# Patient Record
Sex: FEMALE | Race: WHITE | URBAN - METROPOLITAN AREA
[De-identification: names, ages, dates, MRNs, and addresses within clinical notes are randomized per-mention and may not be internally consistent; named-entity substitution may affect disease eponyms.]

---

## 2022-03-29 ENCOUNTER — OFFICE VISIT (OUTPATIENT)
Dept: FAMILY MEDICINE CLINIC | Age: 37
End: 2022-03-29
Payer: COMMERCIAL

## 2022-03-29 VITALS
BODY MASS INDEX: 23.93 KG/M2 | SYSTOLIC BLOOD PRESSURE: 101 MMHG | TEMPERATURE: 97.5 F | WEIGHT: 152.5 LBS | OXYGEN SATURATION: 99 % | HEART RATE: 85 BPM | HEIGHT: 67 IN | DIASTOLIC BLOOD PRESSURE: 58 MMHG

## 2022-03-29 DIAGNOSIS — Z72.0 TOBACCO ABUSE: ICD-10-CM

## 2022-03-29 DIAGNOSIS — Z13.29 SCREENING FOR THYROID DISORDER: ICD-10-CM

## 2022-03-29 DIAGNOSIS — Z00.00 PREVENTATIVE HEALTH CARE: Primary | ICD-10-CM

## 2022-03-29 PROCEDURE — 99385 PREV VISIT NEW AGE 18-39: CPT | Performed by: NURSE PRACTITIONER

## 2022-03-29 RX ORDER — BUPROPION HYDROCHLORIDE 150 MG/1
150 TABLET, EXTENDED RELEASE ORAL 2 TIMES DAILY
Qty: 60 TABLET | Refills: 3 | Status: SHIPPED | OUTPATIENT
Start: 2022-03-29 | End: 2022-04-21

## 2022-03-29 SDOH — HEALTH STABILITY: PHYSICAL HEALTH: ON AVERAGE, HOW MANY MINUTES DO YOU ENGAGE IN EXERCISE AT THIS LEVEL?: 60 MIN

## 2022-03-29 SDOH — HEALTH STABILITY: PHYSICAL HEALTH: ON AVERAGE, HOW MANY DAYS PER WEEK DO YOU ENGAGE IN MODERATE TO STRENUOUS EXERCISE (LIKE A BRISK WALK)?: 5 DAYS

## 2022-03-29 ASSESSMENT — PATIENT HEALTH QUESTIONNAIRE - PHQ9
SUM OF ALL RESPONSES TO PHQ QUESTIONS 1-9: 1
2. FEELING DOWN, DEPRESSED OR HOPELESS: 0
1. LITTLE INTEREST OR PLEASURE IN DOING THINGS: 1
SUM OF ALL RESPONSES TO PHQ9 QUESTIONS 1 & 2: 1
SUM OF ALL RESPONSES TO PHQ QUESTIONS 1-9: 1

## 2022-03-29 ASSESSMENT — SOCIAL DETERMINANTS OF HEALTH (SDOH)

## 2022-03-29 NOTE — PROGRESS NOTES
Forrest Samson  : 1985  Encounter date: 3/29/2022    This keenan 39 y.o. female who presents with  Chief Complaint   Patient presents with    Annual Exam    Other     wants to quit vaping, discuss ADHD dx       History of present illness:    HPI   History and Physical      Forrest Samson  YOB: 1985    Date of Service:  3/29/2022    Chief Complaint:   Forrest Samson is a 39 y.o. female who  presents for physical examination. HPI: Pt is 39year old female, new to practice, due for labs. Requested PMH to be received. Concerns regarding ADHD. Pt vapes and would like to quit. Reports using nicotine supplements with little relief. Pt reports smoking a pod (equal to a pack) every 2 days.     Wt Readings from Last 3 Encounters:   22 152 lb 8 oz (69.2 kg)   13 164 lb (74.4 kg)   13 152 lb (68.9 kg)     BP Readings from Last 3 Encounters:   22 (!) 101/58   13 104/68   13 110/70       Patient Active Problem List   Diagnosis    Active labor    Tobacco abuse       Preventive Care:  Health Maintenance   Topic Date Due    Varicella vaccine (1 of 2 - 2-dose childhood series) Never done    COVID-19 Vaccine (1) Never done    Depression Screen  Never done    Cervical cancer screen  09/10/2017    Flu vaccine (1) Never done    DTaP/Tdap/Td vaccine (2 - Td or Tdap) 2023    Hepatitis C screen  Completed    HIV screen  Completed    Hepatitis A vaccine  Aged Out    Hepatitis B vaccine  Aged Out    Hib vaccine  Aged Out    Meningococcal (ACWY) vaccine  Aged Out    Pneumococcal 0-64 years Vaccine  Aged Out      Hx abnormal PAP: no  Sexual activity: single partner, contraception - none   Self-breast exams: yes  Previous DEXA scan: no  Last eye exam: lasik   Exercise: daily cardio and weight lifting  Lipid panel: No results found for: CHOL, TRIG, HDL, LDLCALC, LDLDIRECT     Living will:No    Immunization History   Administered Date(s) Administered    Tdap (Boostrix, Adacel) 2013       Allergies   Allergen Reactions    Bee Venom      Outpatient Medications Marked as Taking for the 3/29/22 encounter (Office Visit) with ROMERO Bourgeois NP   Medication Sig Dispense Refill    buPROPion (WELLBUTRIN SR) 150 MG extended release tablet Take 1 tablet by mouth 2 times daily First 3 days take 1 tablet once daily 60 tablet 3       Past Medical History:   Diagnosis Date    ADHD (attention deficit hyperactivity disorder)      Past Surgical History:   Procedure Laterality Date    BREAST ENHANCEMENT SURGERY      THERAPEUTIC       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     Family History   Problem Relation Age of Onset    Hypertension Maternal Grandmother     Diabetes Paternal Grandmother     Diabetes Father     Diabetes Paternal Uncle      Social History     Socioeconomic History    Marital status:      Spouse name: Not on file    Number of children: Not on file    Years of education: Not on file    Highest education level: Not on file   Occupational History    Not on file   Tobacco Use    Smoking status: Former Smoker     Packs/day: 0.25     Years: 19.00     Pack years: 4.75     Types: Cigarettes     Start date: 2000     Quit date: 2019     Years since quitting: 3.2    Smokeless tobacco: Never Used   Vaping Use    Vaping Use: Every day    Start date: 2019    Substances: Nicotine    Devices: Pre-filled pod   Substance and Sexual Activity    Alcohol use: Not Currently     Comment: special occasions    Drug use: No    Sexual activity: Yes     Partners: Male   Other Topics Concern    Not on file   Social History Narrative    Not on file     Social Determinants of Health     Financial Resource Strain:     Difficulty of Paying Living Expenses: Not on file   Food Insecurity:     Worried About Running Out of Food in the Last Year: Not on file    Vivien of Food in the Last Year: Not on file Transportation Needs:     Lack of Transportation (Medical): Not on file    Lack of Transportation (Non-Medical): Not on file   Physical Activity: Sufficiently Active    Days of Exercise per Week: 5 days    Minutes of Exercise per Session: 60 min   Stress:     Feeling of Stress : Not on file   Social Connections:     Frequency of Communication with Friends and Family: Not on file    Frequency of Social Gatherings with Friends and Family: Not on file    Attends Muslim Services: Not on file    Active Member of Clubs or Organizations: Not on file    Attends Club or Organization Meetings: Not on file    Marital Status: Not on file   Intimate Partner Violence: Not At Risk    Fear of Current or Ex-Partner: No    Emotionally Abused: No    Physically Abused: No    Sexually Abused: No   Housing Stability:     Unable to Pay for Housing in the Last Year: Not on file    Number of [place] in the Last Year: Not on file    Unstable Housing in the Last Year: Not on file       Review of Systems:  A comprehensive review of systems was negative except for what was noted in the HPI. Physical Exam:   Vitals:    03/29/22 1317   BP: (!) 101/58   Site: Right Upper Arm   Position: Sitting   Cuff Size: Large Adult   Pulse: 85   Temp: 97.5 °F (36.4 °C)   TempSrc: Infrared   SpO2: 99%   Weight: 152 lb 8 oz (69.2 kg)   Height: 5' 7\" (1.702 m)     Body mass index is 23.88 kg/m². Constitutional: She is oriented to person, place, and time. She appears well-developed and well-nourished. No distress. HEENT:   Head: Normocephalic and atraumatic. Right Ear: Tympanic membrane, external ear and ear canal normal.   Left Ear: Tympanic membrane, external ear and ear canal normal.   Nose: Nose normal.   Mouth/Throat: Oropharynx is clear and moist, and mucous membranes are normal.  There is no cervical adenopathy. Eyes: Conjunctivae and extraocular motions are normal. Pupils are equal, round, and reactive to light. Neck: Neck supple. No JVD present. Carotid bruit is not present. No mass and no thyromegaly present. Cardiovascular: Normal rate, regular rhythm, normal heart sounds and intact distal pulses. Exam reveals no gallop and no friction rub. No murmur heard. Pulmonary/Chest: Effort normal and breath sounds normal. No respiratory distress. She has no wheezes, rhonchi or rales. Abdominal: Soft, non-tender. Bowel sounds and aorta are normal. She exhibits no organomegaly, mass or bruit. Breast exam:  not examined. Musculoskeletal: Normal range of motion, no synovitis. She exhibits no edema. Neurological: She is alert and oriented to person, place, and time. She has normal reflexes. No cranial nerve deficit. Coordination normal.   Skin: Skin is warm and dry. There is no rash or erythema. No suspicious lesions noted. Psychiatric: She has a normal mood and affect. Her speech is normal and behavior is normal. Judgment, cognition and memory are normal.     Assessment/Plan:    Jony Oates was seen today for annual exam and other. Diagnoses and all orders for this visit:    Preventative health care  -     CBC with Auto Differential; Future  -     Comprehensive Metabolic Panel, Fasting; Future  -     Lipid, Fasting; Future  -     Denny Feliciano MD, Gynecology, [facility]    Screening for thyroid disorder  -     TSH with Reflex; Future    Tobacco abuse  -     buPROPion (WELLBUTRIN SR) 150 MG extended release tablet; Take 1 tablet by mouth 2 times daily First 3 days take 1 tablet once daily          No current outpatient medications on file prior to visit. No current facility-administered medications on file prior to visit.       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      Family History   Problem Relation Age of Onset    Hypertension Maternal Grandmother     Diabetes Paternal Grandmother     Diabetes Father     Diabetes Paternal Uncle       Social History     Tobacco Use    Smoking status: Former Smoker     Packs/day: 0.25     Years: 19.00     Pack years: 4.75     Types: Cigarettes     Start date: 1/1/2000     Quit date: 1/1/2019     Years since quitting: 3.2    Smokeless tobacco: Never Used   Substance Use Topics    Alcohol use: Not Currently     Comment: special occasions        Review of Systems    Objective:    BP (!) 101/58 (Site: Right Upper Arm, Position: Sitting, Cuff Size: Large Adult)   Pulse 85   Temp 97.5 °F (36.4 °C) (Infrared)   Ht 5' 7\" (1.702 m)   Wt 152 lb 8 oz (69.2 kg)   LMP 03/20/2022 (Approximate)   SpO2 99%   Breastfeeding No   BMI 23.88 kg/m²   Weight: 152 lb 8 oz (69.2 kg)     BP Readings from Last 3 Encounters:   03/29/22 (!) 101/58   04/19/13 104/68   03/22/13 110/70     Wt Readings from Last 3 Encounters:   03/29/22 152 lb 8 oz (69.2 kg)   04/19/13 164 lb (74.4 kg)   03/22/13 152 lb (68.9 kg)     BMI Readings from Last 3 Encounters:   03/29/22 23.88 kg/m²   04/19/13 25.69 kg/m²   03/22/13 23.81 kg/m²       Physical Exam    Assessment/Plan    1. Preventative health care  Advised routine dental and vision  Increased exercise, healthy diet  Advised living will  Advised retrieval [facility]  Discussed ADHD testing   - CBC with Auto Differential; Future  - Comprehensive Metabolic Panel, Fasting; Future  - Lipid, Fasting; Future  - Roderick Jackson MD, Gynecology, [facility]    2. Screening for thyroid disorder  - TSH with Reflex; Future    3. Tobacco abuse  Wellbutrin 150 mg BID      Return in about 1 month (around 4/29/2022) for medication re-check. This dictation was generated by voice recognition computer software.   Although all attempts are made to edit the dictation for accuracy, there may be errors in the transcription that are not intended.

## 2022-04-01 DIAGNOSIS — Z00.00 PREVENTATIVE HEALTH CARE: ICD-10-CM

## 2022-04-01 DIAGNOSIS — Z13.29 SCREENING FOR THYROID DISORDER: ICD-10-CM

## 2022-04-01 LAB
A/G RATIO: 2.5 (ref 1.1–2.2)
ALBUMIN SERPL-MCNC: 4.9 G/DL (ref 3.4–5)
ALP BLD-CCNC: 41 U/L (ref 40–129)
ALT SERPL-CCNC: 15 U/L (ref 10–40)
ANION GAP SERPL CALCULATED.3IONS-SCNC: 14 MMOL/L (ref 3–16)
AST SERPL-CCNC: 18 U/L (ref 15–37)
BASOPHILS ABSOLUTE: 0.1 K/UL (ref 0–0.2)
BASOPHILS RELATIVE PERCENT: 2 %
BILIRUB SERPL-MCNC: 0.4 MG/DL (ref 0–1)
BUN BLDV-MCNC: 11 MG/DL (ref 7–20)
CALCIUM SERPL-MCNC: 9.7 MG/DL (ref 8.3–10.6)
CHLORIDE BLD-SCNC: 103 MMOL/L (ref 99–110)
CHOLESTEROL, FASTING: 197 MG/DL (ref 0–199)
CO2: 23 MMOL/L (ref 21–32)
CREAT SERPL-MCNC: 0.7 MG/DL (ref 0.6–1.1)
EOSINOPHILS ABSOLUTE: 0.1 K/UL (ref 0–0.6)
EOSINOPHILS RELATIVE PERCENT: 1.3 %
GFR AFRICAN AMERICAN: >60
GFR NON-AFRICAN AMERICAN: >60
GLUCOSE FASTING: 80 MG/DL (ref 70–99)
HCT VFR BLD CALC: 40.3 % (ref 36–48)
HDLC SERPL-MCNC: 97 MG/DL (ref 40–60)
HEMOGLOBIN: 13.9 G/DL (ref 12–16)
LDL CHOLESTEROL CALCULATED: 93 MG/DL
LYMPHOCYTES ABSOLUTE: 1.8 K/UL (ref 1–5.1)
LYMPHOCYTES RELATIVE PERCENT: 38.3 %
MCH RBC QN AUTO: 30.7 PG (ref 26–34)
MCHC RBC AUTO-ENTMCNC: 34.6 G/DL (ref 31–36)
MCV RBC AUTO: 88.8 FL (ref 80–100)
MONOCYTES ABSOLUTE: 0.3 K/UL (ref 0–1.3)
MONOCYTES RELATIVE PERCENT: 5.8 %
NEUTROPHILS ABSOLUTE: 2.4 K/UL (ref 1.7–7.7)
NEUTROPHILS RELATIVE PERCENT: 52.6 %
PDW BLD-RTO: 12.9 % (ref 12.4–15.4)
PLATELET # BLD: 268 K/UL (ref 135–450)
PMV BLD AUTO: 9.2 FL (ref 5–10.5)
POTASSIUM SERPL-SCNC: 4.2 MMOL/L (ref 3.5–5.1)
RBC # BLD: 4.54 M/UL (ref 4–5.2)
SODIUM BLD-SCNC: 140 MMOL/L (ref 136–145)
TOTAL PROTEIN: 6.9 G/DL (ref 6.4–8.2)
TRIGLYCERIDE, FASTING: 36 MG/DL (ref 0–150)
TSH REFLEX: 1.36 UIU/ML (ref 0.27–4.2)
VLDLC SERPL CALC-MCNC: 7 MG/DL
WBC # BLD: 4.6 K/UL (ref 4–11)

## 2022-04-21 DIAGNOSIS — Z72.0 TOBACCO ABUSE: ICD-10-CM

## 2022-04-21 RX ORDER — BUPROPION HYDROCHLORIDE 150 MG/1
TABLET, EXTENDED RELEASE ORAL
Qty: 60 TABLET | Refills: 3 | Status: SHIPPED | OUTPATIENT
Start: 2022-04-21

## 2022-05-02 ENCOUNTER — OFFICE VISIT (OUTPATIENT)
Dept: FAMILY MEDICINE CLINIC | Age: 37
End: 2022-05-02
Payer: COMMERCIAL

## 2022-05-02 VITALS
SYSTOLIC BLOOD PRESSURE: 120 MMHG | HEART RATE: 76 BPM | BODY MASS INDEX: 23.83 KG/M2 | TEMPERATURE: 98.6 F | WEIGHT: 152.13 LBS | OXYGEN SATURATION: 99 % | DIASTOLIC BLOOD PRESSURE: 72 MMHG

## 2022-05-02 DIAGNOSIS — Z72.0 TOBACCO ABUSE: Primary | ICD-10-CM

## 2022-05-02 PROCEDURE — 99213 OFFICE O/P EST LOW 20 MIN: CPT | Performed by: NURSE PRACTITIONER

## 2022-05-02 RX ORDER — M-VIT,TX,IRON,MINS/CALC/FOLIC 27MG-0.4MG
1 TABLET ORAL DAILY
COMMUNITY

## 2022-05-02 SDOH — ECONOMIC STABILITY: FOOD INSECURITY: WITHIN THE PAST 12 MONTHS, YOU WORRIED THAT YOUR FOOD WOULD RUN OUT BEFORE YOU GOT MONEY TO BUY MORE.: NEVER TRUE

## 2022-05-02 SDOH — ECONOMIC STABILITY: FOOD INSECURITY: WITHIN THE PAST 12 MONTHS, THE FOOD YOU BOUGHT JUST DIDN'T LAST AND YOU DIDN'T HAVE MONEY TO GET MORE.: NEVER TRUE

## 2022-05-02 ASSESSMENT — SOCIAL DETERMINANTS OF HEALTH (SDOH): HOW HARD IS IT FOR YOU TO PAY FOR THE VERY BASICS LIKE FOOD, HOUSING, MEDICAL CARE, AND HEATING?: NOT HARD AT ALL

## 2022-05-02 NOTE — PROGRESS NOTES
Deedee Mary  : 1985  Encounter date: 2022    This keenan 39 y.o. female who presents with  Chief Complaint   Patient presents with    Other     med follow up       History of present illness:    HPI PT is 39year old female for follow up on wellbutrin for smoking cessation. Reports has decreased vaping to .5 pod per day. Reports often forgets to take second dose in evening.  continues to smoke. Pt has not yet found PSY for ADHD testing, advised to go through insurance and online options. No other concerns. Current Outpatient Medications on File Prior to Visit   Medication Sig Dispense Refill    Multiple Vitamins-Minerals (THERAPEUTIC MULTIVITAMIN-MINERALS) tablet Take 1 tablet by mouth daily      buPROPion (WELLBUTRIN SR) 150 MG extended release tablet TAKE 1 TABLET EVERY DAY FOR 3 DAYS, THEN TAKE 1 TABLET TWICE A DAY THEREAFTER 60 tablet 3     No current facility-administered medications on file prior to visit.       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      Family History   Problem Relation Age of Onset    Hypertension Maternal Grandmother     Diabetes Paternal Grandmother     Diabetes Father     Diabetes Paternal Uncle       Social History     Tobacco Use    Smoking status: Former Smoker     Packs/day: 0.25     Years: 19.00     Pack years: 4.75     Types: Cigarettes     Start date: 2000     Quit date: 2019     Years since quitting: 3.3    Smokeless tobacco: Never Used   Substance Use Topics    Alcohol use: Not Currently     Comment: special occasions        Review of Systems    Objective:    /72 (Site: Left Upper Arm, Position: Sitting, Cuff Size: Medium Adult)   Pulse 76   Temp 98.6 °F (37 °C) (Infrared)   Wt 152 lb 2 oz (69 kg)   SpO2 99%   BMI 23.83 kg/m²   Weight: 152 lb 2 oz (69 kg)     BP Readings from Last 3 Encounters:   05/02/22 120/72   03/29/22 (!) 101/58   04/19/13 104/68     Wt Readings from Last 3 Encounters:   05/02/22 152 lb 2 oz (69 kg)   03/29/22 152 lb 8 oz (69.2 kg)   04/19/13 164 lb (74.4 kg)     BMI Readings from Last 3 Encounters:   05/02/22 23.83 kg/m²   03/29/22 23.88 kg/m²   04/19/13 25.69 kg/m²       Physical Exam  Vitals reviewed. Constitutional:       Appearance: Normal appearance. She is well-developed. Cardiovascular:      Rate and Rhythm: Normal rate and regular rhythm. Pulses: Normal pulses. Heart sounds: Normal heart sounds. No murmur heard. Pulmonary:      Effort: Pulmonary effort is normal.      Breath sounds: Normal breath sounds. Skin:     General: Skin is warm and dry. Neurological:      Mental Status: She is alert and oriented to person, place, and time. Psychiatric:         Mood and Affect: Mood normal.         Assessment/Plan    1. Tobacco abuse  Advised take medication as prescribe  Discussed other smoking cessation options      Return in about 1 year (around 5/2/2023) for annual check up. This dictation was generated by voice recognition computer software. Although all attempts are made to edit the dictation for accuracy, there may be errors in the transcription that are not intended.